# Patient Record
Sex: MALE
[De-identification: names, ages, dates, MRNs, and addresses within clinical notes are randomized per-mention and may not be internally consistent; named-entity substitution may affect disease eponyms.]

---

## 2020-07-06 PROBLEM — Z00.00 ENCOUNTER FOR PREVENTIVE HEALTH EXAMINATION: Status: ACTIVE | Noted: 2020-07-06

## 2020-07-08 ENCOUNTER — APPOINTMENT (OUTPATIENT)
Dept: PEDIATRIC UROLOGY | Facility: CLINIC | Age: 25
End: 2020-07-08
Payer: COMMERCIAL

## 2020-07-08 VITALS — TEMPERATURE: 98.5 F | BODY MASS INDEX: 28.44 KG/M2 | WEIGHT: 210 LBS | HEIGHT: 72 IN

## 2020-07-08 DIAGNOSIS — Q55.20 UNSPECIFIED CONGENITAL MALFORMATIONS OF TESTIS AND SCROTUM: ICD-10-CM

## 2020-07-08 PROCEDURE — 99203 OFFICE O/P NEW LOW 30 MIN: CPT

## 2020-07-08 NOTE — HISTORY OF PRESENT ILLNESS
[TextBox_4] : Kenan is here for consultation.  He reports that the position of the testes changes, especially on the right.  They are always in the scrotum but sometimes they hang lower than at other times.  On rare occasions, he experiences brief periods of mild discomfort in the testes, on either side.  They are self-limited and do not interfere with activities.  There are no associated voiding complaints and there is no nausea or vomiting.   Many years ago he had an episode of more intense but still "not too bad" scrotal pain and the sonogram was normal.

## 2020-07-08 NOTE — REASON FOR VISIT
[Initial Consultation] : an initial consultation [Patient] : patient [TextBox_50] : testis abnormality [TextBox_8] : Dr. Martel Meth

## 2020-07-08 NOTE — CONSULT LETTER
[Dear  ___] : Dear  [unfilled], [Consult Letter:] : I had the pleasure of evaluating your patient, [unfilled]. [FreeTextEntry1] : Please see my note below.\par \par Thank you so very much for allowing to participate in SRULI's care.  Please don't hesitate to call me should any questions or issues arise.\par \par Sincerely, \par \par Rodrigo\par \par Rodrigo Perez MD\par Chief, Pediatric Urology\par Professor of Urology and Pediatrics\par [facility]

## 2020-07-08 NOTE — ASSESSMENT
[FreeTextEntry1] : I reassured Kenan that his testes were normal and that changes in position of the testes is not abnormal as long as they are in the scrotum.  As far as the pain is concerned, these are brief and difficult to ascertain their cause but not at all consistent with torsion.  I explained what it is and why immediate medical attention is needed should intense pain,. on either testis, were to occur to salvage the testis within 6-8 hours.  All questions were answered.

## 2020-07-08 NOTE — PHYSICAL EXAM
[Well developed] : well developed [Well nourished] : well nourished [Well appearing] : well appearing [Deferred] : deferred [Circumcised] : circumcised [At tip of glans] : meatus at tip of glans [Scrotal] : left testicle - scrotal [Vertical] : right testicle - vertical [Symmetric:] : symmetric [Palpable] : palpable [Symmetric] : symmetric [No] : left - not palpable [Acute distress] : no acute distress [Dysmorphic] : no dysmorphic [Abnormal shape] : no abnormal shape [Ear anomaly] : no ear anomaly [Abnormal nose shape] : no abnormal nose shape [Nasal discharge] : no nasal discharge [Mouth lesions] : no mouth lesions [Eye discharge] : no eye discharge [Icteric sclera] : no icteric sclera [Labored breathing] : non- labored breathing [Rigid] : not rigid [Hepatomegaly] : no hepatomegaly [Mass] : no mass [Palpable bladder] : no palpable bladder [Splenomegaly] : no splenomegaly [RUQ Tenderness] : no ruq tenderness [LUQ Tenderness] : no luq tenderness [RLQ Tenderness] : no rlq tenderness [LLQ Tenderness] : no llq tenderness [Left tenderness] : no left tenderness [Right tenderness] : no right tenderness [Renomegaly] : no renomegaly [Right-side mass] : no right-side mass [Dimple] : no dimple [Left-side mass] : no left-side mass [Hair Tuft] : no hair tuft [Limited limb movement] : no limited limb movement [Edema] : no edema [Rashes] : no rashes [Ulcers] : no ulcers [Abnormal turgor] : normal turgor [Tenderness Right] : no tenderness - right [Tenderness Left] : no tenderness - left [Induration] : no induration [Mass:] : no mass [Tenderness] : no tenderness